# Patient Record
Sex: MALE | Race: WHITE | NOT HISPANIC OR LATINO | Employment: FULL TIME | URBAN - METROPOLITAN AREA
[De-identification: names, ages, dates, MRNs, and addresses within clinical notes are randomized per-mention and may not be internally consistent; named-entity substitution may affect disease eponyms.]

---

## 2020-03-30 ENCOUNTER — OFFICE VISIT (OUTPATIENT)
Dept: FAMILY MEDICINE CLINIC | Facility: CLINIC | Age: 40
End: 2020-03-30

## 2020-03-30 VITALS
TEMPERATURE: 98 F | BODY MASS INDEX: 24.2 KG/M2 | DIASTOLIC BLOOD PRESSURE: 76 MMHG | WEIGHT: 169 LBS | HEART RATE: 74 BPM | OXYGEN SATURATION: 100 % | HEIGHT: 70 IN | SYSTOLIC BLOOD PRESSURE: 122 MMHG

## 2020-03-30 DIAGNOSIS — M13.0 INFLAMMATION OF MULTIPLE JOINTS: ICD-10-CM

## 2020-03-30 DIAGNOSIS — R00.2 PALPITATIONS: Primary | ICD-10-CM

## 2020-03-30 PROCEDURE — 99203 OFFICE O/P NEW LOW 30 MIN: CPT | Performed by: FAMILY MEDICINE

## 2020-03-30 PROCEDURE — 93000 ELECTROCARDIOGRAM COMPLETE: CPT | Performed by: FAMILY MEDICINE

## 2020-03-30 RX ORDER — OMEGA-3S/DHA/EPA/FISH OIL/D3 300MG-1000
400 CAPSULE ORAL DAILY
COMMUNITY

## 2020-03-30 RX ORDER — ALBUTEROL SULFATE 90 UG/1
1 AEROSOL, METERED RESPIRATORY (INHALATION) AS NEEDED
COMMUNITY
Start: 2019-11-07 | End: 2020-03-30

## 2020-03-30 NOTE — PROGRESS NOTES
Pancho Trinidad is a 39 y.o. male.     Chief Complaint   Patient presents with   • Establish Care     pt is here to establish care   • Gout     pt says that he gets gout every ~1.5 years, not experiencing now    • Chest Pain     pt has a flutter feeling in chest often at night, every day last week    • Wrist Pain     Pt says that his entire R wrist was very swollen in October,  limited range of motion, same with L ankle.        HPI     Pt is a pleasant 39 y.o. YO male here to discuss palpitations as well as intermittent joint pain/inflammation.  Patient is new to me and to [facility] and is here to establish care.    Palpitations - started one week ago, sometimes feels like his heart is beating out of his chest. Symptoms are intermittent, patient unable to recall if associated with movement or with rest. Thinks that maybe he has felt some shortness of breath when it occurs. Seems to happen more at night/bedtime when he is laying a certain way. Also sometimes has a pinch like feeling on his left chest, resolves when he exhales and relaxes his abdomen. He had similar symptoms about a year ago that resolved on their own, seen at an [facility] around that time and told it was most likely due to anxiety. He is worried as his sister has some type of arrhythmia    Joint Pain - patient also presents with intermittent joint pains. First started with the great toe of his left foot, about 10 years ago, told that he most likely had gout and treated once for it. Occurs intermittently. Then 6-7 years ago started having issues in his left ankle and right wrist. Symptom for both are episodic, he will have symptoms on and off for a time and then be symptom free. When it occurs he has pain, limited mobility of the joint and swelling to various degrees - when swelling is more severe has accompanying warmth and redness. Has never seen ortho. Did see hand once during an episode with the wrist and was told they were  concerned for a cyst although nothing was seen on imaging.     He did used to be active as a , he is unsure if his wrist/ankle problems are related to overuse vs. Related to what has caused him symptoms in his great toe. He has never undergone joint aspiration during a flare up.     The following portions of the patient's history were reviewed by me and updated as appropriate: allergies, current medications, past family history, past medical history, past social history, past surgical history, and problem list.     Review of Systems   Constitutional: Negative.    HENT: Negative.    Eyes: Negative.    Respiratory: Negative.    Cardiovascular: Positive for palpitations. Negative for chest pain and leg swelling.   Gastrointestinal: Negative.    Endocrine: Negative.    Genitourinary: Negative.    Musculoskeletal:        Right wrist discomfort/ decreased range of motion   Skin: Negative.    Allergic/Immunologic: Negative.    Neurological: Negative.    Hematological: Negative.    Psychiatric/Behavioral: Negative.      I have reviewed and confirmed the accuracy of the ROS as documented by the MA/LPDELBERT/RN Ely Hare MD    Objective  Vitals:    03/30/20 1420   BP: 122/76   Pulse: 74   Temp: 98 °F (36.7 °C)   SpO2: 100%     Body mass index is 24.25 kg/m².       Physical Exam   Constitutional: He is oriented to person, place, and time. He appears well-developed and well-nourished. No distress.   HENT:   Head: Normocephalic and atraumatic.   Nose: Nose normal.   Eyes: Conjunctivae are normal. Right eye exhibits no discharge. Left eye exhibits no discharge. No scleral icterus.   Cardiovascular: Normal rate, regular rhythm and normal heart sounds.   No murmur heard.  Pulmonary/Chest: Effort normal and breath sounds normal. No stridor. No respiratory distress. He has no wheezes. He has no rales.   Neurological: He is alert and oriented to person, place, and time.   Skin: No erythema. No pallor.   Psychiatric: He  has a normal mood and affect. His behavior is normal. Judgment and thought content normal.   Vitals reviewed.        Current Outpatient Medications:   •  cholecalciferol (VITAMIN D3) 10 MCG (400 UNIT) tablet, Take 400 Units by mouth Daily., Disp: , Rfl:   •  Omega 3-6-9 Fatty Acids (OMEGA-3-6-9 PO), Take  by mouth Daily., Disp: , Rfl:   •  Potassium 75 MG tablet, Take  by mouth Daily., Disp: , Rfl:     Indication for ECG - PALPITATIONS    ECG 12 Lead  Date/Time: 3/30/2020 4:37 PM  Performed by: Ely Mccollum MD  Authorized by: Ely Mccollum MD   Comparison: not compared with previous ECG   Previous ECG: no previous ECG available  Rhythm: sinus rhythm  Rate: normal  BPM: 66  Conduction: conduction normal  ST Segments: ST segments normal  T Waves: T waves normal  QRS axis: normal  Other: no other findings    Clinical impression: normal ECG            Lab Results (most recent)     None              Pancho was seen today for establish care, gout, chest pain and wrist pain.    Diagnoses and all orders for this visit:    Palpitations  Patient presents with approximately 1 week of intermittent palpitations as well as a pinch-like sensation in his left chest.  I do not think that symptoms are very typical for cardiac etiology.  I think most likely related to stress/anxiety versus abnormality of the chest wall or musculoskeletal problem.  Patient does not have any other known cardiac risk factors.  Will obtain a lipid panel today to assess overall cardiovascular risk.  His blood pressure was normal today.  His cardiac exam today was normal.  EKG was also normal sinus rhythm.  Will evaluate electrolytes, CBC, as well as TSH to determine that there are no underlying metabolic disorders that may be contributing.  In the meantime encourage patient to keep a symptom journal and to write down when symptoms occur and what he is doing at that time.  Since symptoms have already begun to resolve over this week will  not set up for any of further evaluation or work-up at this time.  If symptoms start to become more persistent or more frequent may consider further evaluation with a Holter monitor.  -     Comprehensive Metabolic Panel  -     CBC & Differential  -     Lipid Panel  -     TSH Rfx On Abnormal To Free T4  -     ECG 12 Lead    Inflammation of multiple joints  Patient presents with several years of intermittent joint pain and inflammation of the left great toe, left ankle, and right wrist.  Symptoms may or may not be related to one another.  He has never had formal evaluation for gout.  We will check a uric acid level today.  We will also get him established with orthopedics so that he may possibly undergo joint aspiration during an acute episode in the future should it occur.  Lastly we will also check CRP and FRIEDA to screen for possible rheumatologic etiologies.  Symptoms in his wrist have been improving over the last couple days, if they do worsen again before patient can be evaluated by orthopedics may trial empirically treating him as if it is gout.  -     Uric acid  -     C-reactive Protein  -     FRIEDA  -     Ambulatory Referral to Orthopedic Surgery          Return in about 6 months (around 9/30/2020) for Annual physical.      Ely Hare MD

## 2020-03-31 LAB
ALBUMIN SERPL-MCNC: 4.7 G/DL (ref 3.5–5.2)
ALBUMIN/GLOB SERPL: 2 G/DL
ALP SERPL-CCNC: 72 U/L (ref 39–117)
ALT SERPL-CCNC: 22 U/L (ref 1–41)
ANA SER QL: NEGATIVE
AST SERPL-CCNC: 15 U/L (ref 1–40)
BASOPHILS # BLD AUTO: 0.02 10*3/MM3 (ref 0–0.2)
BASOPHILS NFR BLD AUTO: 0.5 % (ref 0–1.5)
BILIRUB SERPL-MCNC: 0.5 MG/DL (ref 0.2–1.2)
BUN SERPL-MCNC: 14 MG/DL (ref 6–20)
BUN/CREAT SERPL: 15.9 (ref 7–25)
CALCIUM SERPL-MCNC: 10.1 MG/DL (ref 8.6–10.5)
CHLORIDE SERPL-SCNC: 104 MMOL/L (ref 98–107)
CHOLEST SERPL-MCNC: 178 MG/DL (ref 0–200)
CO2 SERPL-SCNC: 28.2 MMOL/L (ref 22–29)
CREAT SERPL-MCNC: 0.88 MG/DL (ref 0.76–1.27)
CRP SERPL-MCNC: 1.54 MG/DL (ref 0–0.5)
EOSINOPHIL # BLD AUTO: 0.05 10*3/MM3 (ref 0–0.4)
EOSINOPHIL NFR BLD AUTO: 1.2 % (ref 0.3–6.2)
ERYTHROCYTE [DISTWIDTH] IN BLOOD BY AUTOMATED COUNT: 13.1 % (ref 12.3–15.4)
GLOBULIN SER CALC-MCNC: 2.4 GM/DL
GLUCOSE SERPL-MCNC: 106 MG/DL (ref 65–99)
HCT VFR BLD AUTO: 44.3 % (ref 37.5–51)
HDLC SERPL-MCNC: 58 MG/DL (ref 40–60)
HGB BLD-MCNC: 15.3 G/DL (ref 13–17.7)
IMM GRANULOCYTES # BLD AUTO: 0.01 10*3/MM3 (ref 0–0.05)
IMM GRANULOCYTES NFR BLD AUTO: 0.2 % (ref 0–0.5)
LDLC SERPL CALC-MCNC: 97 MG/DL (ref 0–100)
LYMPHOCYTES # BLD AUTO: 1.35 10*3/MM3 (ref 0.7–3.1)
LYMPHOCYTES NFR BLD AUTO: 31.6 % (ref 19.6–45.3)
MCH RBC QN AUTO: 30.1 PG (ref 26.6–33)
MCHC RBC AUTO-ENTMCNC: 34.5 G/DL (ref 31.5–35.7)
MCV RBC AUTO: 87 FL (ref 79–97)
MONOCYTES # BLD AUTO: 0.48 10*3/MM3 (ref 0.1–0.9)
MONOCYTES NFR BLD AUTO: 11.2 % (ref 5–12)
NEUTROPHILS # BLD AUTO: 2.36 10*3/MM3 (ref 1.7–7)
NEUTROPHILS NFR BLD AUTO: 55.3 % (ref 42.7–76)
NRBC BLD AUTO-RTO: 0 /100 WBC (ref 0–0.2)
PLATELET # BLD AUTO: 291 10*3/MM3 (ref 140–450)
POTASSIUM SERPL-SCNC: 5.1 MMOL/L (ref 3.5–5.2)
PROT SERPL-MCNC: 7.1 G/DL (ref 6–8.5)
RBC # BLD AUTO: 5.09 10*6/MM3 (ref 4.14–5.8)
SODIUM SERPL-SCNC: 142 MMOL/L (ref 136–145)
TRIGL SERPL-MCNC: 117 MG/DL (ref 0–150)
TSH SERPL DL<=0.005 MIU/L-ACNC: 1.35 UIU/ML (ref 0.27–4.2)
URATE SERPL-MCNC: 8.4 MG/DL (ref 3.4–7)
VLDLC SERPL CALC-MCNC: 23.4 MG/DL
WBC # BLD AUTO: 4.27 10*3/MM3 (ref 3.4–10.8)

## 2020-06-29 ENCOUNTER — TELEPHONE (OUTPATIENT)
Dept: FAMILY MEDICINE CLINIC | Facility: CLINIC | Age: 40
End: 2020-06-29

## 2020-06-29 NOTE — TELEPHONE ENCOUNTER
Patient wife called to get the patient the an appointment. He is having some GI issues and abdominal pain. She also advise he has been having some diarrhea and blood in stool and mucus.     Please advise patient at [phone]

## 2020-07-09 ENCOUNTER — OFFICE VISIT (OUTPATIENT)
Dept: FAMILY MEDICINE CLINIC | Facility: CLINIC | Age: 40
End: 2020-07-09

## 2020-07-09 VITALS
SYSTOLIC BLOOD PRESSURE: 112 MMHG | TEMPERATURE: 97.9 F | OXYGEN SATURATION: 100 % | DIASTOLIC BLOOD PRESSURE: 78 MMHG | WEIGHT: 165 LBS | BODY MASS INDEX: 23.62 KG/M2 | HEART RATE: 71 BPM | HEIGHT: 70 IN

## 2020-07-09 DIAGNOSIS — R53.83 FATIGUE, UNSPECIFIED TYPE: Primary | ICD-10-CM

## 2020-07-09 DIAGNOSIS — R68.2 DRY MOUTH: ICD-10-CM

## 2020-07-09 DIAGNOSIS — M25.571 ARTHRALGIA OF ANKLE, RIGHT: ICD-10-CM

## 2020-07-09 DIAGNOSIS — K92.1 BLOOD IN STOOL: ICD-10-CM

## 2020-07-09 PROCEDURE — 99213 OFFICE O/P EST LOW 20 MIN: CPT | Performed by: FAMILY MEDICINE

## 2020-07-09 RX ORDER — IBUPROFEN 400 MG/1
400 TABLET ORAL EVERY 6 HOURS PRN
COMMUNITY

## 2020-07-09 NOTE — PROGRESS NOTES
Pancho Trinidad is a 39 y.o. male.     Chief Complaint   Patient presents with   • Foot Pain     right foot swelling and pain  for long toime it comes and goes    • Fatigue     month feeling extra tired and would like to check    • Stool Color Change     mucus in stool and a few time blood not sure since when        HPI     Pt is a  39 y.o. YO male here for multiple complaints including right foot/ankle pain and swelling, fatigue, and changes in his stool.     Symptoms of foot pain -ongoing over the last week but have improved during the last day or 2.  When pointed to the area of the pain patient points to his right ankle.  Says that pain is migratory and location of the pain changes hour by hour.  Identifies it as being different from the gout flareups that he experiences in his toe every year or year and a half.  States that it keeps him from working and sleeping.  Patient is very concerned that he may have a rheumatologic condition or psoriatic arthritis.  He had a skin condition several years ago that was diagnosed as psoriasis, not currently an issue.    Patient also complains of feeling generally fatigued over the last couple of months, states that he has a chronically dry mouth and that sometimes his urine is dark in color.  He takes many over-the-counter supplements (more than 15) see in scanned media.  He also typically drinks 60 to 75 ounces of water when he first wakes up in the morning.  He is concerned that there could be something autoimmune going on or that he is having kidney or liver failure that could cause his imminent death.  He has been reading about a lot of different conditions online and is very worried.    Patient also endorses having noticed that there is sometimes small amounts of mucus in his stool and had 2 episodes where he had small amounts of blood.  He does have a history of hemorrhoids.  He states that his stools are typically normal first thing in the morning but become  loose and watery as his fluid intake increases during the day.    He states that he does have high anxiety and stress levels, attributes these to work and does not seem to think that any of his symptoms are related to this.    The following portions of the patient's history were reviewed by me and updated as appropriate: allergies, current medications, past family history, past medical history, past social history, past surgical history, and problem list.     Review of Systems   Constitutional: Positive for fatigue.   Eyes: Negative.    Respiratory: Negative.    Cardiovascular: Negative.    Gastrointestinal: Negative.    Endocrine: Negative.    Genitourinary: Negative.    Musculoskeletal: Positive for arthralgias, joint swelling and myalgias.        Right foot pain and swelling    Allergic/Immunologic: Negative.    Neurological: Negative.    Hematological: Negative.    Psychiatric/Behavioral: Negative.    I have reviewed and confirmed the accuracy of the ROS as documented by the MA/LPN/EMMA Hare MD    Objective  Vitals:    07/09/20 1052   BP: 112/78   Pulse: 71   Temp: 97.9 °F (36.6 °C)   SpO2: 100%     Body mass index is 23.68 kg/m².       Physical Exam   Constitutional: He is oriented to person, place, and time. He appears well-developed and well-nourished. No distress.   HENT:   Head: Normocephalic and atraumatic.   Nose: Nose normal.   Eyes: Conjunctivae are normal. Right eye exhibits no discharge. Left eye exhibits no discharge. No scleral icterus.   Pulmonary/Chest: Effort normal. No respiratory distress.   Neurological: He is alert and oriented to person, place, and time.   Skin: No erythema. No pallor.   Psychiatric: He has a normal mood and affect. His behavior is normal. Judgment and thought content normal.   Vitals reviewed.        Current Outpatient Medications:   •  cholecalciferol (VITAMIN D3) 10 MCG (400 UNIT) tablet, Take 400 Units by mouth Daily., Disp: , Rfl:   •  ibuprofen  (ADVIL,MOTRIN) 400 MG tablet, Take 400 mg by mouth Every 6 (Six) Hours As Needed for Mild Pain ., Disp: , Rfl:   •  Omega 3-6-9 Fatty Acids (OMEGA-3-6-9 PO), Take  by mouth Daily., Disp: , Rfl:   •  Potassium 75 MG tablet, Take  by mouth Daily., Disp: , Rfl:     Procedures    Lab Results (most recent)     None              Pancho was seen today for foot pain, fatigue and stool color change.    Diagnoses and all orders for this visit:    Fatigue, unspecified type  -     Ambulatory Referral to Rheumatology    Dry mouth  -     Ambulatory Referral to Rheumatology    Arthralgia of ankle, right  -     Ambulatory Referral to Rheumatology    Blood in stool  -     Ambulatory Referral to Gastroenterology      39-year-old male who presents today with multiple systemic complaints.  He is very concerned about the symptoms that he is having and is not reassured by his recently normal lab work.  He would like to pursue specialty evaluation.  He particularly wishes to pursue specialty evaluation with rheumatology.  He thinks that his symptoms may all be inter- connected.  I reassured patient that overall blood work on last check a couple months ago was normal with normal liver and kidney function.  He did have a mildly elevated CRP.  I discussed with him that I did feel like having high stress and anxiety levels could be contributing to his symptoms and he acknowledged this, however further medical work-up is warranted to rule out other causes of his symptoms.  I have placed referral for rheumatology.    For his loose stool as well as episodes of blood in stool I think that it may be related to excessive amounts of water/hydration at one time in combination with his hemorrhoids, however will refer patient to gastroenterology for further evaluation of this problem and to see if they feel that a colonoscopy is warranted.    Return if symptoms worsen or fail to improve.      Ely Hare MD

## 2020-07-28 ENCOUNTER — OFFICE VISIT (OUTPATIENT)
Dept: GASTROENTEROLOGY | Facility: CLINIC | Age: 40
End: 2020-07-28

## 2020-07-28 VITALS — WEIGHT: 161.9 LBS | TEMPERATURE: 98.2 F | BODY MASS INDEX: 23.18 KG/M2 | HEIGHT: 70 IN

## 2020-07-28 DIAGNOSIS — K62.5 RECTAL BLEED: Primary | ICD-10-CM

## 2020-07-28 PROCEDURE — 99204 OFFICE O/P NEW MOD 45 MIN: CPT | Performed by: INTERNAL MEDICINE

## 2020-07-28 NOTE — PROGRESS NOTES
Chief Complaint   Patient presents with   • Black or Bloody Stool       Pancho Trinidad is a 39 y.o. male who presents with GI bleed mucus in the stool change in bowel habits joint pain    39-year-old that presents with rectal bleeding intermittent with mucus in the stool.  Occasional diarrhea.  He also has bilateral arthralgias, he is seeing a rheumatologist for the first time tomorrow    He has had the symptoms now for about 6 months.  No eye infections or rashes  He has chronic fatigue insomnia  He has swelling occasionally in the lower extremities  Denies melena  No known personal or family history of autoimmune condition      Past Medical History:   Diagnosis Date   • Gout        Past Surgical History:   Procedure Laterality Date   • WISDOM TOOTH EXTRACTION Bilateral 1994         Current Outpatient Medications:   •  cholecalciferol (VITAMIN D3) 10 MCG (400 UNIT) tablet, Take 400 Units by mouth Daily., Disp: , Rfl:   •  Omega 3-6-9 Fatty Acids (OMEGA-3-6-9 PO), Take  by mouth Daily., Disp: , Rfl:   •  Potassium 75 MG tablet, Take  by mouth Daily., Disp: , Rfl:   •  ibuprofen (ADVIL,MOTRIN) 400 MG tablet, Take 400 mg by mouth Every 6 (Six) Hours As Needed for Mild Pain ., Disp: , Rfl:     No Known Allergies    Social History     Socioeconomic History   • Marital status:      Spouse name: Not on file   • Number of children: Not on file   • Years of education: Not on file   • Highest education level: Not on file   Tobacco Use   • Smoking status: Never Smoker   • Smokeless tobacco: Current User     Types: Chew   Substance and Sexual Activity   • Alcohol use: Not Currently     Comment: weekends   • Drug use: Never       Family History   Problem Relation Age of Onset   • Cancer Maternal Grandmother    • Gout Maternal Grandfather    • Prostate cancer Maternal Grandfather    • Cancer Maternal Grandfather    • No Known Problems Mother    • No Known Problems Father    • Arrhythmia Sister    • Cancer Paternal  Grandfather        Review of Systems   Gastrointestinal: Positive for abdominal pain, anal bleeding, blood in stool and diarrhea.   All other systems reviewed and are negative.      Vitals:    07/28/20 1015   Temp: 98.2 °F (36.8 °C)       Physical Exam   Constitutional: He is oriented to person, place, and time. He appears well-developed and well-nourished.   HENT:   Head: Normocephalic and atraumatic.   Eyes: Conjunctivae and EOM are normal.   Neck: Normal range of motion. No tracheal deviation present.   Cardiovascular: Normal rate and regular rhythm.   Pulmonary/Chest: Effort normal and breath sounds normal. No respiratory distress.   Abdominal: Soft. Bowel sounds are normal. He exhibits no distension and no mass. There is no tenderness. There is no rebound and no guarding.   Musculoskeletal: Normal range of motion.   Neurological: He is alert and oriented to person, place, and time.   Skin: Skin is warm and dry.   Psychiatric: He has a normal mood and affect. Judgment normal.   Nursing note and vitals reviewed.    Problem list    Rectal bleeding  Change in bowel habits with mucus in the stool  Occasional diarrhea  Arthralgias  Arthritis  Chronic fatigue      Assessment/Plan    I worry about GI autoimmune condition such as celiac sprue or ulcerative colitis or Crohn's disease  She will have an EGD and colonoscopy with appropriate biopsies of the duodenum, ileum and colon to look for the above autoimmune conditions  We will await full rheumatology work-up  We will not order any blood testing today as the rheumatologist likely would do a good amount of blood testing to screen for autoimmune disorders  Further recommendations after EGD and colonoscopy are performed

## 2020-08-13 ENCOUNTER — OUTSIDE FACILITY SERVICE (OUTPATIENT)
Dept: GASTROENTEROLOGY | Facility: CLINIC | Age: 40
End: 2020-08-13

## 2020-08-13 PROCEDURE — 43239 EGD BIOPSY SINGLE/MULTIPLE: CPT | Performed by: INTERNAL MEDICINE

## 2020-08-13 PROCEDURE — 45380 COLONOSCOPY AND BIOPSY: CPT | Performed by: INTERNAL MEDICINE

## 2020-08-24 NOTE — PROGRESS NOTES
Duodenal lymphocytosis  Please bring him in for a full celiac panel  Office visit Lisa 1 month  Colonoscopy recall 10 years

## 2020-08-25 ENCOUNTER — TELEPHONE (OUTPATIENT)
Dept: GASTROENTEROLOGY | Facility: CLINIC | Age: 40
End: 2020-08-25

## 2020-08-25 DIAGNOSIS — K31.9 MUCOSAL ABNORMALITY OF DUODENUM: Primary | ICD-10-CM

## 2020-08-25 NOTE — TELEPHONE ENCOUNTER
Called pt and advised of the note from Dr Rodríguez. He verb understanding. Lab appt made for tomorrow. Follow-up appt made for 9/16.

## 2020-08-27 LAB
ENDOMYSIUM IGA SER QL: NEGATIVE
GLIADIN PEPTIDE IGA SER-ACNC: 5 UNITS (ref 0–19)
GLIADIN PEPTIDE IGG SER-ACNC: 2 UNITS (ref 0–19)
IGA SERPL-MCNC: 243 MG/DL (ref 90–386)
TTG IGA SER-ACNC: <2 U/ML (ref 0–3)
TTG IGG SER-ACNC: 3 U/ML (ref 0–5)

## 2020-09-16 ENCOUNTER — OFFICE VISIT (OUTPATIENT)
Dept: GASTROENTEROLOGY | Facility: CLINIC | Age: 40
End: 2020-09-16

## 2020-09-16 VITALS — WEIGHT: 160 LBS | HEIGHT: 70 IN | BODY MASS INDEX: 22.9 KG/M2

## 2020-09-16 DIAGNOSIS — R19.5 LOOSE STOOLS: ICD-10-CM

## 2020-09-16 DIAGNOSIS — R53.83 MALAISE AND FATIGUE: ICD-10-CM

## 2020-09-16 DIAGNOSIS — R14.0 BLOATING SYMPTOM: ICD-10-CM

## 2020-09-16 DIAGNOSIS — R19.4 CHANGE IN BOWEL HABITS: Primary | ICD-10-CM

## 2020-09-16 DIAGNOSIS — R85.7 ABNORMAL SMALL BOWEL BIOPSY: ICD-10-CM

## 2020-09-16 DIAGNOSIS — R53.81 MALAISE AND FATIGUE: ICD-10-CM

## 2020-09-16 PROCEDURE — 99442 PR PHYS/QHP TELEPHONE EVALUATION 11-20 MIN: CPT | Performed by: NURSE PRACTITIONER

## 2020-09-16 NOTE — PROGRESS NOTES
"Chief Complaint   Patient presents with   • Diarrhea   • Rectal Bleeding     HPI    You have chosen to receive care through a telephone visit. Do you consent to use a telephone visit for your medical care today? yes    Pancho Trinidad is a  40 y.o. male here for a follow up visit for rectal bleeding, mucous in stool, and occasional diarrhea.  This patient follows with Dr. Rodríguez, new to me.  He status post endoscopic evaluation for his symptoms dated 8/13/2020 which I reviewed as follows:    EGD with normal findings.  Biopsies with duodenal lymphocytosis however celiac panel was negative.  Colonoscopy with normal ileum and nonbleeding internal hemorrhoids.  Call 10 years.    He is following with rheumatologist with normal imaging. He was found to have markers for psoriatic arthritis.    On visit today he is still struggling with fatigue, episodes of diarrhea, gas, bloating, and intermittent nausea.  His appetite is good and his weight is stable.  Diarrhea mainly occurs in the afternoons with passage of formed stools in the morning.  He averages around 5 bowel movements a day.  He still passing mucus but no further issues with rectal bleeding since May.  He denies abdominal pain but does report daily \"abdominal uneasiness.\"    He is also under some stress right now as his father was recently diagnosed with autoimmune liver disease and told he had cirrhosis.    Past Medical History:   Diagnosis Date   • Gout        Past Surgical History:   Procedure Laterality Date   • WISDOM TOOTH EXTRACTION Bilateral 1994       Scheduled Meds:  Outpatient Encounter Medications as of 9/16/2020   Medication Sig Dispense Refill   • cholecalciferol (VITAMIN D3) 10 MCG (400 UNIT) tablet Take 400 Units by mouth Daily.     • ibuprofen (ADVIL,MOTRIN) 400 MG tablet Take 400 mg by mouth Every 6 (Six) Hours As Needed for Mild Pain .     • MILK THISTLE PO Take  by mouth Daily.     • Multiple Vitamins-Minerals (MULTIVITAMIN MEN PO) Take  by " mouth Daily.     • Omega 3-6-9 Fatty Acids (OMEGA-3-6-9 PO) Take  by mouth Daily.     • Potassium 75 MG tablet Take  by mouth Daily.     • TURMERIC PO Take  by mouth Daily.       No facility-administered encounter medications on file as of 9/16/2020.        Continuous Infusions:No current facility-administered medications for this visit.       PRN Meds:.    No Known Allergies    Social History     Socioeconomic History   • Marital status:      Spouse name: Not on file   • Number of children: Not on file   • Years of education: Not on file   • Highest education level: Not on file   Tobacco Use   • Smoking status: Never Smoker   • Smokeless tobacco: Current User     Types: Chew   Substance and Sexual Activity   • Alcohol use: Not Currently     Comment: weekends   • Drug use: Never       Family History   Problem Relation Age of Onset   • Cancer Maternal Grandmother    • Gout Maternal Grandfather    • Prostate cancer Maternal Grandfather    • Cancer Maternal Grandfather    • No Known Problems Mother    • No Known Problems Father    • Arrhythmia Sister    • Cancer Paternal Grandfather        Review of Systems   Constitutional: Positive for fatigue. Negative for activity change, appetite change, fever and unexpected weight change.   HENT: Negative for trouble swallowing.    Respiratory: Negative for apnea, cough, choking, chest tightness, shortness of breath and wheezing.    Cardiovascular: Negative for chest pain, palpitations and leg swelling.   Gastrointestinal: Positive for diarrhea. Negative for abdominal distention, abdominal pain, anal bleeding, blood in stool, constipation, nausea, rectal pain and vomiting.        + Gas, bloating, occasional loose stools       There were no vitals filed for this visit.    Physical Exam  Neurological:      Mental Status: He is oriented to person, place, and time.   Psychiatric:         Mood and Affect: Mood normal.         Behavior: Behavior normal.       No radiology  results for the last 7 days    Pancho was seen today for diarrhea and rectal bleeding.    Diagnoses and all orders for this visit:    Change in bowel habits  -     CBC & Differential  -     Comprehensive Metabolic Panel  -     TSH    Loose stools  -     CBC & Differential  -     Comprehensive Metabolic Panel  -     TSH  -     US Gallbladder; Future  -     NM HIDA Scan With Pharmacological Intervention; Future    Malaise and fatigue  -     CBC & Differential  -     Comprehensive Metabolic Panel  -     TSH    Bloating symptom  -     US Gallbladder; Future  -     NM HIDA Scan With Pharmacological Intervention; Future    Abnormal small bowel biopsy    Assessment/plan    Pleasant 40-year-old male seen today in follow-up via telehealth visit/phone call status post endoscopic evaluation as outlined above.  Patient found to have duodenal lymphocytosis on small bowel biopsy but follow-up celiac panel was normal.  Still having fatigue, intermittent episodes of diarrhea, bloating, and abdominal discomfort.    Moving forward recommend breath testing to rule out SIBO.  Obtain updated labs to include CBC, CMP, and TSH.  Obtain gallbladder work-up to include ultrasound and HIDA scan.  Once he completes SIBO breath testing trial of gluten-free diet for 1 month.    Return to clinic 4 weeks.  Further recommendations and follow-up pending the aforementioned work-up.    (Telehealth visit/phone call 20 minutes duration.)

## 2020-09-17 ENCOUNTER — TELEPHONE (OUTPATIENT)
Dept: GASTROENTEROLOGY | Facility: CLINIC | Age: 40
End: 2020-09-17

## 2020-09-17 NOTE — TELEPHONE ENCOUNTER
----- Message from Tunde Guzman Rep sent at 9/17/2020 11:03 AM EDT -----  Regarding: questions  Contact: [phone]  Pt called is has some questions regarding some of the testing ordered

## 2020-09-17 NOTE — TELEPHONE ENCOUNTER
Called pt back. Pt states he was seen yesterday in the office and was told about having labs and a breath test done, but then received a call about getting radiology tests and was not expecting that. Explained tests to pt, how they are done and what they are looking for. He is agreeable to having them done and will call insurance to verify they are covered and what oop cost will be. Advised will place the order for the SIBO breath test online and it will be mailed to his home address. Pt verb understanding.   Lab appt made from tomorrow at 0800. F/U appt made for 10/15 at 1:30PM.         Lisa Mahmood, SHIRA  [facility]             Please mail a SIBO breath testing kit to the patient's home and schedule a lab draw at visit.  Patient also needs to schedule 4 week follow-up which could be done over the phone if needed.

## 2020-09-18 ENCOUNTER — LAB (OUTPATIENT)
Dept: GASTROENTEROLOGY | Facility: CLINIC | Age: 40
End: 2020-09-18

## 2020-09-18 LAB
BASOPHILS # BLD AUTO: 0.03 10*3/MM3 (ref 0–0.2)
BASOPHILS NFR BLD AUTO: 0.4 % (ref 0–1.5)
EOSINOPHIL # BLD AUTO: 0.1 10*3/MM3 (ref 0–0.4)
EOSINOPHIL NFR BLD AUTO: 1.4 % (ref 0.3–6.2)
ERYTHROCYTE [DISTWIDTH] IN BLOOD BY AUTOMATED COUNT: 13.4 % (ref 12.3–15.4)
HCT VFR BLD AUTO: 46.8 % (ref 37.5–51)
HGB BLD-MCNC: 16 G/DL (ref 13–17.7)
IMM GRANULOCYTES # BLD AUTO: 0.01 10*3/MM3 (ref 0–0.05)
IMM GRANULOCYTES NFR BLD AUTO: 0.1 % (ref 0–0.5)
LYMPHOCYTES # BLD AUTO: 2.96 10*3/MM3 (ref 0.7–3.1)
LYMPHOCYTES NFR BLD AUTO: 41.3 % (ref 19.6–45.3)
MCH RBC QN AUTO: 29.3 PG (ref 26.6–33)
MCHC RBC AUTO-ENTMCNC: 34.2 G/DL (ref 31.5–35.7)
MCV RBC AUTO: 85.6 FL (ref 79–97)
MONOCYTES # BLD AUTO: 0.59 10*3/MM3 (ref 0.1–0.9)
MONOCYTES NFR BLD AUTO: 8.2 % (ref 5–12)
NEUTROPHILS # BLD AUTO: 3.47 10*3/MM3 (ref 1.7–7)
NEUTROPHILS NFR BLD AUTO: 48.6 % (ref 42.7–76)
NRBC BLD AUTO-RTO: 0 /100 WBC (ref 0–0.2)
PLATELET # BLD AUTO: 308 10*3/MM3 (ref 140–450)
RBC # BLD AUTO: 5.47 10*6/MM3 (ref 4.14–5.8)
WBC # BLD AUTO: 7.16 10*3/MM3 (ref 3.4–10.8)

## 2020-09-19 LAB
ALBUMIN SERPL-MCNC: 4.9 G/DL (ref 3.5–5.2)
ALBUMIN/GLOB SERPL: 2.2 G/DL
ALP SERPL-CCNC: 72 U/L (ref 39–117)
ALT SERPL-CCNC: 18 U/L (ref 1–41)
AST SERPL-CCNC: 15 U/L (ref 1–40)
BILIRUB SERPL-MCNC: 0.6 MG/DL (ref 0–1.2)
BUN SERPL-MCNC: 9 MG/DL (ref 6–20)
BUN/CREAT SERPL: 9.2 (ref 7–25)
CALCIUM SERPL-MCNC: 9.9 MG/DL (ref 8.6–10.5)
CHLORIDE SERPL-SCNC: 101 MMOL/L (ref 98–107)
CO2 SERPL-SCNC: 30.5 MMOL/L (ref 22–29)
CREAT SERPL-MCNC: 0.98 MG/DL (ref 0.76–1.27)
GLOBULIN SER CALC-MCNC: 2.2 GM/DL
GLUCOSE SERPL-MCNC: 126 MG/DL (ref 65–99)
POTASSIUM SERPL-SCNC: 4.4 MMOL/L (ref 3.5–5.2)
PROT SERPL-MCNC: 7.1 G/DL (ref 6–8.5)
SODIUM SERPL-SCNC: 142 MMOL/L (ref 136–145)
TSH SERPL DL<=0.005 MIU/L-ACNC: 4.56 UIU/ML (ref 0.27–4.2)

## 2020-09-22 ENCOUNTER — TELEPHONE (OUTPATIENT)
Dept: GASTROENTEROLOGY | Facility: CLINIC | Age: 40
End: 2020-09-22

## 2020-09-22 NOTE — TELEPHONE ENCOUNTER
----- Message from SHIAR Neal sent at 9/16/2020 11:37 AM EDT -----  Please mail a SIBO breath testing kit to the patient's home and schedule a lab draw at visit.  Patient also needs to schedule 4 week follow-up which could be done over the phone if needed.

## 2020-09-29 ENCOUNTER — TELEPHONE (OUTPATIENT)
Dept: GASTROENTEROLOGY | Facility: CLINIC | Age: 40
End: 2020-09-29

## 2020-09-29 NOTE — TELEPHONE ENCOUNTER
----- Message from SHIRA Neal sent at 9/21/2020  9:14 AM EDT -----  Let Pancho know that his lab work shows normal kidney and liver function.  No anemia.  His thyroid function is elevated however.  This could be affecting his bowel pattern.  I would still complete work-up that we discussed on recent office visit   but I want him to touch base with his PCP regarding elevated thyroid levels.  Thanks BG

## 2020-10-01 ENCOUNTER — APPOINTMENT (OUTPATIENT)
Dept: ULTRASOUND IMAGING | Facility: HOSPITAL | Age: 40
End: 2020-10-01

## 2020-10-01 ENCOUNTER — APPOINTMENT (OUTPATIENT)
Dept: NUCLEAR MEDICINE | Facility: HOSPITAL | Age: 40
End: 2020-10-01

## 2020-10-02 ENCOUNTER — TELEPHONE (OUTPATIENT)
Dept: GASTROENTEROLOGY | Facility: CLINIC | Age: 40
End: 2020-10-02

## 2020-10-02 NOTE — TELEPHONE ENCOUNTER
Please inform the patient that breath testing does NOT support small intestinal bacterial overgrowth.

## 2020-10-05 NOTE — TELEPHONE ENCOUNTER
Patient called, left message(VM identified patient) advised as per Lisa's note. Advised to call back for questions.

## 2020-10-06 ENCOUNTER — TELEPHONE (OUTPATIENT)
Dept: GASTROENTEROLOGY | Facility: CLINIC | Age: 40
End: 2020-10-06

## 2020-10-06 NOTE — TELEPHONE ENCOUNTER
----- Message from SHIRA Neal sent at 10/2/2020  4:08 PM EDT -----  Regarding: FW: peer to peer  Clari I called to do a peer to peer on this patient's HIDA scan and has actually been approved.  Can we get this set up for the patient.  Approval number is [id number].  Thanks BG  ----- Message -----  From: Angel De La Vega RegSched Rep  Sent: 9/30/2020  12:15 PM EDT  To: SHIRA Neal  Subject: peer to peer                                     Luana from [facility] [place] is calling and said the hida scan has went to peer to peer. She said it is scheduled for tomorrow. She said you will need to call [phone] option 3 claim # is [id number]

## 2020-10-09 ENCOUNTER — HOSPITAL ENCOUNTER (OUTPATIENT)
Dept: NUCLEAR MEDICINE | Facility: HOSPITAL | Age: 40
Discharge: HOME | End: 2020-10-09

## 2020-10-09 ENCOUNTER — HOSPITAL ENCOUNTER (OUTPATIENT)
Dept: ULTRASOUND IMAGING | Facility: HOSPITAL | Age: 40
Discharge: HOME | End: 2020-10-09
Admitting: NURSE PRACTITIONER

## 2020-10-09 DIAGNOSIS — R19.5 LOOSE STOOLS: ICD-10-CM

## 2020-10-09 DIAGNOSIS — R14.0 BLOATING SYMPTOM: ICD-10-CM

## 2020-10-09 PROCEDURE — 76705 ECHO EXAM OF ABDOMEN: CPT

## 2020-10-09 PROCEDURE — 25010000002 SINCALIDE PER 5 MCG: Performed by: NURSE PRACTITIONER

## 2020-10-09 PROCEDURE — 0 TECHNETIUM TC 99M MEBROFENIN KIT: Performed by: NURSE PRACTITIONER

## 2020-10-09 PROCEDURE — 78227 HEPATOBIL SYST IMAGE W/DRUG: CPT

## 2020-10-09 PROCEDURE — A9537 TC99M MEBROFENIN: HCPCS | Performed by: NURSE PRACTITIONER

## 2020-10-09 RX ORDER — KIT FOR THE PREPARATION OF TECHNETIUM TC 99M MEBROFENIN 45 MG/10ML
1 INJECTION, POWDER, LYOPHILIZED, FOR SOLUTION INTRAVENOUS
Status: COMPLETED | OUTPATIENT
Start: 2020-10-09 | End: 2020-10-09

## 2020-10-09 RX ADMIN — MEBROFENIN 1 DOSE: 45 INJECTION, POWDER, LYOPHILIZED, FOR SOLUTION INTRAVENOUS at 11:20

## 2020-10-09 RX ADMIN — SINCALIDE 1.5 MCG: 5 INJECTION, POWDER, LYOPHILIZED, FOR SOLUTION INTRAVENOUS at 12:32

## 2020-10-09 NOTE — PROGRESS NOTES
Please inform Juan Pabloangela that his gallbladder ultrasound is normal.  No evidence of gallstones or inflammation.  Keep follow-up.

## 2020-10-12 NOTE — PROGRESS NOTES
Please notify the patient that his HIDA scan is normal.  We can discuss further at next office visit.

## 2020-10-15 ENCOUNTER — OFFICE VISIT (OUTPATIENT)
Dept: GASTROENTEROLOGY | Facility: CLINIC | Age: 40
End: 2020-10-15

## 2020-10-15 VITALS — BODY MASS INDEX: 22.19 KG/M2 | HEIGHT: 70 IN | TEMPERATURE: 97.1 F | WEIGHT: 155 LBS

## 2020-10-15 DIAGNOSIS — R19.5 LOOSE STOOLS: ICD-10-CM

## 2020-10-15 DIAGNOSIS — R10.30 LOWER ABDOMINAL PAIN: Primary | ICD-10-CM

## 2020-10-15 DIAGNOSIS — R53.81 MALAISE AND FATIGUE: ICD-10-CM

## 2020-10-15 DIAGNOSIS — R85.7 ABNORMAL SMALL BOWEL BIOPSY: ICD-10-CM

## 2020-10-15 DIAGNOSIS — R53.83 MALAISE AND FATIGUE: ICD-10-CM

## 2020-10-15 DIAGNOSIS — M54.50 LOW BACK PAIN, UNSPECIFIED BACK PAIN LATERALITY, UNSPECIFIED CHRONICITY, UNSPECIFIED WHETHER SCIATICA PRESENT: ICD-10-CM

## 2020-10-15 PROCEDURE — 99443 PR PHYS/QHP TELEPHONE EVALUATION 21-30 MIN: CPT | Performed by: NURSE PRACTITIONER

## 2020-10-15 NOTE — PROGRESS NOTES
Chief Complaint   Patient presents with   • Change in bowel habits     HPI    You have chosen to receive care through a telephone visit. Do you consent to use a telephone visit for your medical care today? yes    Pancho Trinidad is a  40 y.o. male here for a follow up visit for diarrhea, gas and bloating with intermittent nausea.  This patient follows Dr. Rodríguez known to me.  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  Colonoscopy with nonbleeding internal hemorrhoids otherwise normal.  Recall 10 years.    Recently gallbladder work-up came back normal.  Patient had negative SIBO testing.  His labs are essentially normal other than a TSH of 4.560.    On visit today his primary complaint is lower abdominal pain and low back pain described as a dull ache that comes and goes with associated diarrhea that happened several days out of the week, excessive gas and bloating, and fatigue.  He has been avoiding gluten for the past 2 weeks with some improvement in symptoms.    No nausea, vomiting, acid reflux/heartburn, or dysphagia.  His appetite is good.  Initial weight loss has plateaued.    Past Medical History:   Diagnosis Date   • Gout        Past Surgical History:   Procedure Laterality Date   • COLONOSCOPY  08/13/2020   • UPPER GASTROINTESTINAL ENDOSCOPY  08/13/2020   • WISDOM TOOTH EXTRACTION Bilateral 1994       Scheduled Meds:  Outpatient Encounter Medications as of 10/15/2020   Medication Sig Dispense Refill   • cholecalciferol (VITAMIN D3) 10 MCG (400 UNIT) tablet Take 400 Units by mouth Daily.     • MILK THISTLE PO Take  by mouth Daily.     • Multiple Vitamins-Minerals (MULTIVITAMIN MEN PO) Take  by mouth Daily.     • Omega 3-6-9 Fatty Acids (OMEGA-3-6-9 PO) Take  by mouth Daily.     • Potassium 75 MG tablet Take  by mouth Daily.     • TURMERIC PO Take  by mouth Daily.      • ibuprofen (ADVIL,MOTRIN) 400 MG tablet Take 400 mg by mouth Every 6 (Six) Hours As Needed for Mild Pain .       No facility-administered encounter medications on file as of 10/15/2020.        Continuous Infusions:No current facility-administered medications for this visit.       PRN Meds:.    No Known Allergies    Social History     Socioeconomic History   • Marital status:      Spouse name: Not on file   • Number of children: Not on file   • Years of education: Not on file   • Highest education level: Not on file   Tobacco Use   • Smoking status: Never Smoker   • Smokeless tobacco: Current User     Types: Chew   Substance and Sexual Activity   • Alcohol use: Not Currently     Comment: weekends   • Drug use: Never       Family History   Problem Relation Age of Onset   • Cancer Maternal Grandmother    • Gout Maternal Grandfather    • Prostate cancer Maternal Grandfather    • Cancer Maternal Grandfather    • No Known Problems Mother    • Liver disease Father    • Arrhythmia Sister    • Cancer Paternal Grandfather        Review of Systems   Constitutional: Negative for activity change, appetite change, fatigue, fever and unexpected weight change.   HENT: Negative for trouble swallowing.    Respiratory: Negative for apnea, cough, choking, chest tightness, shortness of breath and wheezing.    Cardiovascular: Negative for chest pain, palpitations and leg swelling.   Gastrointestinal: Positive for abdominal pain and diarrhea. Negative for abdominal distention, anal bleeding, blood in stool, constipation, nausea, rectal pain and vomiting.   Musculoskeletal: Positive for back pain.       Vitals:    10/15/20 1336   Temp: 97.1 °F (36.2 °C)       Physical Exam  Neurological:      Mental Status: He is oriented to person, place, and time.   Psychiatric:         Mood and Affect: Mood normal.         Behavior: Behavior normal.       No radiology results for the last 7 days    Diagnoses and all orders for this  visit:    1. Lower abdominal pain (Primary)  -     CT Abdomen Pelvis With Contrast; Future    2. Low back pain, unspecified back pain laterality, unspecified chronicity, unspecified whether sciatica present  -     CT Abdomen Pelvis With Contrast; Future    3. Abnormal small bowel biopsy    4. Loose stools    5. Malaise and fatigue    Assessment/plan    Pleasant 40-year-old male seen today via telehealth visit/phone call with a primary complaint of lower abdominal pain and low back pain with intermittent loose stools, fatigue, and gas and bloating.  Slight to minimal improvement with recent gluten-free diet for 2 weeks.  Recent gallbladder work-up entirely normal.  Breath testing negative for SIBO.  Recent labs normal other than mildly elevated TSH for which she needs to follow-up with his PCP for.    Given complaints of lower abdominal pain low back pain will arrange CT abdomen and pelvis with IV and oral contrast.  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  He could have a component of gluten intolerance.    Further recommendations and follow-up pending imaging.  Patient was agreeable to referral to [facility] and I provided him with her name and phone number as well as her email.    (Telehealth visit/phone call 25 minutes duration.)

## 2020-11-06 ENCOUNTER — HOSPITAL ENCOUNTER (OUTPATIENT)
Dept: CT IMAGING | Facility: HOSPITAL | Age: 40
Discharge: HOME | End: 2020-11-06
Admitting: NURSE PRACTITIONER

## 2020-11-06 DIAGNOSIS — M54.50 LOW BACK PAIN, UNSPECIFIED BACK PAIN LATERALITY, UNSPECIFIED CHRONICITY, UNSPECIFIED WHETHER SCIATICA PRESENT: ICD-10-CM

## 2020-11-06 DIAGNOSIS — R10.30 LOWER ABDOMINAL PAIN: ICD-10-CM

## 2020-11-06 PROCEDURE — 0 DIATRIZOATE MEGLUMINE & SODIUM PER 1 ML: Performed by: NURSE PRACTITIONER

## 2020-11-06 PROCEDURE — 25010000002 IOPAMIDOL 61 % SOLUTION: Performed by: NURSE PRACTITIONER

## 2020-11-06 PROCEDURE — 74177 CT ABD & PELVIS W/CONTRAST: CPT

## 2020-11-06 RX ADMIN — IOPAMIDOL 85 ML: 612 INJECTION, SOLUTION INTRAVENOUS at 09:00

## 2020-11-06 RX ADMIN — DIATRIZOATE MEGLUMINE AND DIATRIZOATE SODIUM 30 ML: 600; 100 SOLUTION ORAL; RECTAL at 07:55

## 2020-11-06 NOTE — PROGRESS NOTES
Call Pancho and let him know that his CT of the abdomen and pelvis shows some evidence of bladder wall thickening.  No colitis.  He does have a moderate stool burden.  I want him to follow-up with his PCP for non-GI findings.  Lets go ahead and have him complete a urinalysis.  Start Metamucil 1 tablespoon taken daily as well.

## 2020-11-20 ENCOUNTER — TELEPHONE (OUTPATIENT)
Dept: GASTROENTEROLOGY | Facility: CLINIC | Age: 40
End: 2020-11-20

## 2020-11-20 NOTE — TELEPHONE ENCOUNTER
----- Message from SHIRA Neal sent at 10/9/2020  3:48 PM EDT -----  Please inform Pancho that his gallbladder ultrasound is normal.  No evidence of gallstones or inflammation.  Keep follow-up.

## 2020-11-20 NOTE — TELEPHONE ENCOUNTER
----- Message from SHIRA Neal sent at 10/12/2020  1:36 PM EDT -----  Please notify the patient that his HIDA scan is normal.  We can discuss further at next office visit.

## 2020-11-20 NOTE — TELEPHONE ENCOUNTER
Patient called. Advised as per Lisa's note. He states he will call his PCP and get his u/a done there. F/u visit with Lisa, 12/29/2020@1300 via phone.

## 2020-11-20 NOTE — TELEPHONE ENCOUNTER
----- Message from Radha Segura sent at 11/20/2020  8:45 AM EST -----  Contact: [phone]  Patient is asking for CT results.  Please call.

## 2020-12-01 ENCOUNTER — OFFICE VISIT (OUTPATIENT)
Dept: FAMILY MEDICINE CLINIC | Facility: CLINIC | Age: 40
End: 2020-12-01

## 2020-12-01 VITALS
BODY MASS INDEX: 23.19 KG/M2 | SYSTOLIC BLOOD PRESSURE: 110 MMHG | DIASTOLIC BLOOD PRESSURE: 66 MMHG | WEIGHT: 162 LBS | OXYGEN SATURATION: 98 % | HEIGHT: 70 IN | HEART RATE: 66 BPM | TEMPERATURE: 96.6 F

## 2020-12-01 DIAGNOSIS — Z23 NEED FOR IMMUNIZATION AGAINST INFLUENZA: ICD-10-CM

## 2020-12-01 DIAGNOSIS — N32.89 BLADDER WALL THICKENING: ICD-10-CM

## 2020-12-01 DIAGNOSIS — R79.89 ELEVATED TSH: ICD-10-CM

## 2020-12-01 DIAGNOSIS — R41.89 BRAIN FOG: ICD-10-CM

## 2020-12-01 DIAGNOSIS — R53.82 CHRONIC FATIGUE: Primary | ICD-10-CM

## 2020-12-01 LAB
BILIRUB BLD-MCNC: NEGATIVE MG/DL
CLARITY, POC: CLEAR
COLOR UR: YELLOW
GLUCOSE UR STRIP-MCNC: NEGATIVE MG/DL
KETONES UR QL: NEGATIVE
LEUKOCYTE EST, POC: NEGATIVE
NITRITE UR-MCNC: NEGATIVE MG/ML
PH UR: 6 [PH] (ref 5–8)
PROT UR STRIP-MCNC: NEGATIVE MG/DL
RBC # UR STRIP: NEGATIVE /UL
SP GR UR: 1.01 (ref 1–1.03)
UROBILINOGEN UR QL: NORMAL

## 2020-12-01 PROCEDURE — 81003 URINALYSIS AUTO W/O SCOPE: CPT | Performed by: FAMILY MEDICINE

## 2020-12-01 PROCEDURE — 90686 IIV4 VACC NO PRSV 0.5 ML IM: CPT | Performed by: FAMILY MEDICINE

## 2020-12-01 PROCEDURE — 90471 IMMUNIZATION ADMIN: CPT | Performed by: FAMILY MEDICINE

## 2020-12-01 PROCEDURE — 99214 OFFICE O/P EST MOD 30 MIN: CPT | Performed by: FAMILY MEDICINE

## 2020-12-01 RX ORDER — SODIUM FLUORIDE 6 MG/ML
PASTE, DENTIFRICE DENTAL
COMMUNITY
Start: 2020-09-28

## 2020-12-01 NOTE — PROGRESS NOTES
"    Pancho Trinidad is a 40 y.o. male.     Chief Complaint   Patient presents with   • GI Problem     pt was told to follow up with pcp after CT - thickening of bladder, \"too much stool in colon\" pt has tried a gluten-free diet with no changes in symptoms    • Hypothyroidism     pt was told that he has low thyroid levels    • Immunizations     pt needs flu shot     Masks/face shield/appropriate PPE were worn for the entirety of the visit by the patient, MA, and provider.     HPI     Pt is a pleasant 40 y.o. YO male here for follow-up of ET scan that was done by gastroenterology for ongoing symptoms of abdominal discomfort and gas.  He is also here to discuss symptoms of fatigue and brain fog that have been ongoing over this last year.      Patient had an abdominal CT scan done for further evaluation of his symptoms.  The CT scan showed a moderate amount of stool in the colon as well as nonspecific thickening of the bladder wall.  He was told to follow-up for a urine analysis to check for urinary tract infection. Patient also had a TSH level done with gastroenterology which was mildly elevated.  His TSH level in March of this year was normal.    He is concerned if either the findings on CT could be related to his fatigue and brain fog he has been experiencing.  He also continues to have ankle pain and often feels very thirsty.    He did see rheumatology since her last visit and was told by them that he could possibly have psoriatic arthritis but that in general testing was inconclusive.    The following portions of the patient's history were reviewed by me and updated as appropriate: allergies, current medications, past family history, past medical history, past social history, past surgical history, and problem list.     Review of Systems   Constitutional: Positive for fatigue.   Eyes: Negative.    Respiratory: Negative.    Cardiovascular: Negative.  Negative for chest pain, palpitations and leg swelling. "   Gastrointestinal: Positive for abdominal pain.   Endocrine: Positive for polydipsia.   Genitourinary: Positive for flank pain.   Musculoskeletal: Positive for arthralgias.   Skin: Negative.    Allergic/Immunologic: Negative.    Neurological: Positive for headaches.   Hematological: Negative.    Psychiatric/Behavioral: Negative.    I have reviewed and confirmed the accuracy of the ROS as documented by the MA/LPN/RN Ely Hare MD    Objective  Vitals:    12/01/20 1517   BP: 110/66   Pulse: 66   Temp: 96.6 °F (35.9 °C)   SpO2: 98%     Body mass index is 23.24 kg/m².       Physical Exam  Vitals signs reviewed.   Constitutional:       General: He is not in acute distress.     Appearance: He is well-developed.   HENT:      Head: Normocephalic and atraumatic.   Eyes:      General: No scleral icterus.        Right eye: No discharge.         Left eye: No discharge.      Conjunctiva/sclera: Conjunctivae normal.   Pulmonary:      Effort: Pulmonary effort is normal. No respiratory distress.   Skin:     Coloration: Skin is not pale.      Findings: No erythema.   Neurological:      Mental Status: He is alert and oriented to person, place, and time.   Psychiatric:         Behavior: Behavior normal.         Thought Content: Thought content normal.         Judgment: Judgment normal.           Current Outpatient Medications:   •  CBD oil (cannabidiol) capsule, Take  by mouth., Disp: , Rfl:   •  cholecalciferol (VITAMIN D3) 10 MCG (400 UNIT) tablet, Take 400 Units by mouth Daily., Disp: , Rfl:   •  ibuprofen (ADVIL,MOTRIN) 400 MG tablet, Take 400 mg by mouth Every 6 (Six) Hours As Needed for Mild Pain ., Disp: , Rfl:   •  Magnesium Gluconate (MAGNESIUM 27 PO), Take  by mouth., Disp: , Rfl:   •  MILK THISTLE PO, Take  by mouth Daily., Disp: , Rfl:   •  Misc Natural Products (TART CHERRY ADVANCED PO), Take  by mouth., Disp: , Rfl:   •  Multiple Vitamins-Minerals (MULTIVITAMIN MEN PO), Take  by mouth Daily., Disp: , Rfl:   •   Omega 3-6-9 Fatty Acids (OMEGA-3-6-9 PO), Take  by mouth Daily., Disp: , Rfl:   •  Potassium 75 MG tablet, Take  by mouth Daily., Disp: , Rfl:   •  TURMERIC PO, Take  by mouth Daily., Disp: , Rfl:   •  PreviDent 5000 Booster Plus 1.1 % paste, , Disp: , Rfl:     Procedures    Office Visit on 12/01/2020   Component Date Value Ref Range Status   • Color 12/01/2020 Yellow  Yellow, Straw, Dark Yellow, Yina Final   • Clarity, UA 12/01/2020 Clear  Clear Final   • Specific Gravity  12/01/2020 1.010  1.005 - 1.030 Final   • pH, Urine 12/01/2020 6.0  5.0 - 8.0 Final   • Leukocytes 12/01/2020 Negative  Negative Final   • Nitrite, UA 12/01/2020 Negative  Negative Final   • Protein, POC 12/01/2020 Negative  Negative mg/dL Final   • Glucose, UA 12/01/2020 Negative  Negative, 1000 mg/dL (3+) mg/dL Final   • Ketones, UA 12/01/2020 Negative  Negative Final   • Urobilinogen, UA 12/01/2020 Normal  Normal Final   • Bilirubin 12/01/2020 Negative  Negative Final   • Blood, UA 12/01/2020 Negative  Negative Final           Diagnoses and all orders for this visit:    1. Chronic fatigue (Primary), Brain fog  Patient with ongoing symptoms of significant fatigue as well as the feeling that he is not thinking clearly or has brain fog over the last year.  He did recently have thyroid levels which suggested possible hypothyroidism.  Will repeat thyroid levels today with TSH as well as T4.  Also will check for possible testosterone deficiency as well as check cortisol and aldosterone levels for adrenal insufficiency as possible causes of his symptoms.  We will follow-up as needed based on lab results.  -     TSH  -     T4, Free  -     Testosterone (Free & Total), LC / MS  -     Cortisol  -     SARS-CoV-2 Antibodies (Roche)  -     Comprehensive Metabolic Panel  -     Aldosterone    Elevated TSH  -     TSH  -     T4, Free    Bladder wall thickening  Patient with evidence of bladder wall thickening on CT scan.  This is a new problem to me.  His  urine dip done in the office today was completely normal.  Discussed with patient that I do not think it represents anything acutely concerning but would recommend evaluation with urology for further recommendations.  Patient was agreeable to this plan and referral was placed.  -     POCT urinalysis dipstick, automated  -     Ambulatory Referral to Urology    Need for immunization against influenza  -     FluLaval Quad >6 Months ([id number])      Return in about 4 months (around 4/1/2021) for Recheck.      Ely Hare MD

## 2020-12-07 LAB
ALBUMIN SERPL-MCNC: 4.6 G/DL (ref 4–5)
ALBUMIN/GLOB SERPL: 1.7 {RATIO} (ref 1.2–2.2)
ALDOST SERPL-MCNC: 12.3 NG/DL (ref 0–30)
ALP SERPL-CCNC: 74 IU/L (ref 39–117)
ALT SERPL-CCNC: 18 IU/L (ref 0–44)
AST SERPL-CCNC: 19 IU/L (ref 0–40)
BILIRUB SERPL-MCNC: 0.7 MG/DL (ref 0–1.2)
BUN SERPL-MCNC: 12 MG/DL (ref 6–20)
BUN/CREAT SERPL: 12 (ref 9–20)
CALCIUM SERPL-MCNC: 9.8 MG/DL (ref 8.7–10.2)
CHLORIDE SERPL-SCNC: 99 MMOL/L (ref 96–106)
CO2 SERPL-SCNC: 24 MMOL/L (ref 20–29)
CORTIS SERPL-MCNC: 11.7 UG/DL
CREAT SERPL-MCNC: 1 MG/DL (ref 0.76–1.27)
GLOBULIN SER CALC-MCNC: 2.7 G/DL (ref 1.5–4.5)
GLUCOSE SERPL-MCNC: 87 MG/DL (ref 65–99)
POTASSIUM SERPL-SCNC: 4.8 MMOL/L (ref 3.5–5.2)
PROT SERPL-MCNC: 7.3 G/DL (ref 6–8.5)
SARS-COV-2 AB SERPL QL IA: NEGATIVE
SODIUM SERPL-SCNC: 138 MMOL/L (ref 134–144)
T4 FREE SERPL-MCNC: 1.42 NG/DL (ref 0.82–1.77)
TESTOST FREE SERPL-MCNC: 18.2 PG/ML (ref 8.7–25.1)
TESTOST SERPL-MCNC: 596.9 NG/DL (ref 264–916)
TSH SERPL DL<=0.005 MIU/L-ACNC: 2.6 UIU/ML (ref 0.45–4.5)

## 2020-12-23 ENCOUNTER — TELEPHONE (OUTPATIENT)
Dept: FAMILY MEDICINE CLINIC | Facility: CLINIC | Age: 40
End: 2020-12-23

## 2020-12-23 DIAGNOSIS — Z91.89 RISK OF EXPOSURE TO LYME DISEASE: Primary | ICD-10-CM

## 2020-12-23 NOTE — TELEPHONE ENCOUNTER
Pt is requesting to be screened for lyme disease - if okay- please confirm that I have ordered the correct test.

## 2020-12-29 ENCOUNTER — OFFICE VISIT (OUTPATIENT)
Dept: GASTROENTEROLOGY | Facility: CLINIC | Age: 40
End: 2020-12-29

## 2020-12-29 VITALS — BODY MASS INDEX: 23.19 KG/M2 | HEIGHT: 70 IN | WEIGHT: 162 LBS

## 2020-12-29 DIAGNOSIS — R85.7 ABNORMAL SMALL BOWEL BIOPSY: ICD-10-CM

## 2020-12-29 DIAGNOSIS — R10.84 GENERALIZED ABDOMINAL CRAMPS: Primary | ICD-10-CM

## 2020-12-29 PROCEDURE — 99442 PR PHYS/QHP TELEPHONE EVALUATION 11-20 MIN: CPT | Performed by: NURSE PRACTITIONER

## 2020-12-29 NOTE — PROGRESS NOTES
Chief Complaint   Patient presents with   • Change in bowel habits   • CT Follow Up     HPI    You have chosen to receive care through a telephone visit. Do you consent to use a telephone visit for your medical care today? Yes    Pancho Trinidad is a  40 y.o. male here for a follow up visit for for diarrhea, gas and bloating with intermittent nausea.  This patient follows Dr. Rodríguez known to me.  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  Colonoscopy with nonbleeding internal hemorrhoids otherwise normal.  Recall 10 years.     Recently gallbladder work-up came back normal.  Patient had negative SIBO testing.  His labs were essentially normal other than a TSH of 4.560    Reviewed recent CT A/P performed for complaints of low abdominal pain and low back pain demonstrating nonspecific thickening of the bladder and moderate stool burden.  No evidence of colitis.    Currently still experiencing lower abdominal cramping but this is improved.  Bowels move 1-2 times a day with formed consistency.  He denies abdominal pain, rectal pain, rectal bleeding.  No nausea, vomiting, acid reflux/heartburn, or dysphagia.  Appetite is good.  His weight is stable. He actually resumed gluten diet because he did not feel any different with the gluten free diet.     Past Medical History:   Diagnosis Date   • Gout        Past Surgical History:   Procedure Laterality Date   • COLONOSCOPY  08/13/2020   • UPPER GASTROINTESTINAL ENDOSCOPY  08/13/2020   • WISDOM TOOTH EXTRACTION Bilateral 1994       Scheduled Meds:  Outpatient Encounter Medications as of 12/29/2020   Medication Sig Dispense Refill   • CBD oil (cannabidiol) capsule Take  by mouth.     • cholecalciferol (VITAMIN D3) 10 MCG (400 UNIT) tablet Take 400 Units by mouth Daily.     • ibuprofen (ADVIL,MOTRIN) 400 MG tablet Take 400 mg by mouth  Every 6 (Six) Hours As Needed for Mild Pain .     • Magnesium Gluconate (MAGNESIUM 27 PO) Take  by mouth.     • MILK THISTLE PO Take  by mouth Daily.     • Misc Natural Products (TART CHERRY ADVANCED PO) Take  by mouth.     • Multiple Vitamins-Minerals (MULTIVITAMIN MEN PO) Take  by mouth Daily.     • Omega 3-6-9 Fatty Acids (OMEGA-3-6-9 PO) Take  by mouth Daily.     • Potassium 75 MG tablet Take  by mouth Daily.     • PreviDent 5000 Booster Plus 1.1 % paste      • TURMERIC PO Take  by mouth Daily.       No facility-administered encounter medications on file as of 12/29/2020.        Continuous Infusions:No current facility-administered medications for this visit.       PRN Meds:.    No Known Allergies    Social History     Socioeconomic History   • Marital status:      Spouse name: Not on file   • Number of children: Not on file   • Years of education: Not on file   • Highest education level: Not on file   Tobacco Use   • Smoking status: Never Smoker   • Smokeless tobacco: Current User     Types: Chew   Substance and Sexual Activity   • Alcohol use: Not Currently     Comment: weekends   • Drug use: Never       Family History   Problem Relation Age of Onset   • Cancer Maternal Grandmother    • Gout Maternal Grandfather    • Prostate cancer Maternal Grandfather    • Cancer Maternal Grandfather    • No Known Problems Mother    • Liver disease Father    • Arrhythmia Sister    • Cancer Paternal Grandfather        Review of Systems   Constitutional: Negative for activity change, appetite change, fatigue, fever and unexpected weight change.   HENT: Negative for trouble swallowing.    Respiratory: Negative for apnea, cough, choking, chest tightness, shortness of breath and wheezing.    Cardiovascular: Negative for chest pain, palpitations and leg swelling.   Gastrointestinal: Negative for abdominal distention, abdominal pain, anal bleeding, blood in stool, constipation, diarrhea, nausea, rectal pain and vomiting.         + Lower abdominal cramping       There were no vitals filed for this visit.    Physical Exam  Neurological:      Mental Status: He is oriented to person, place, and time.       No radiology results for the last 7 days    Diagnoses and all orders for this visit:    1. Generalized abdominal cramps (Primary)    2. Abnormal small bowel biopsy    Assessment/plan    Pleasant 40-year-old male seen today in follow-up with continued complaints of abdominal cramps.  He has had an extensive work-up to date with the only abnormality being abnormal small bowel biopsy concerning for possible gluten allergy or gluten intolerance but patient did not feel any different off of gluten for up to 3 months.  I reviewed recent CT with the patient.  He has had a thorough urologic work-up as well.  Moving forward recommend trial of IBgard 2 tablets twice daily to improve abdominal cramps.  Patient to follow-up in 3 to 6 months.  Call with any questions or concerns in the interim.    (Telehealth visit/phone call 20 minutes duration.)

## 2024-04-02 NOTE — TELEPHONE ENCOUNTER
"Attempted to contact the patient in regards to the referral we received from Aundrea Jolly for \"Polyp of colon\". Patient did not answer so I left a voicemail requesting a call back. Patient is in an active 5 year colon recall with Dr. Barclay's office and can be scheduled for the next available recall screening call as long as he isn't having other symptoms. Deferring out for two days to give the patient time to call the office back.   " ----- Message from Ezequiel Rodríguez MD sent at 8/24/2020  9:27 AM EDT -----  Duodenal lymphocytosis  Please bring him in for a full celiac panel  Office visit Lisa 1 month  Colonoscopy recall 10 years